# Patient Record
Sex: MALE | Race: WHITE
[De-identification: names, ages, dates, MRNs, and addresses within clinical notes are randomized per-mention and may not be internally consistent; named-entity substitution may affect disease eponyms.]

---

## 2018-12-26 ENCOUNTER — HOSPITAL ENCOUNTER (EMERGENCY)
Dept: HOSPITAL 12 - ER | Age: 83
Discharge: ANOTHER HOSPITAL | End: 2018-12-26
Payer: COMMERCIAL

## 2018-12-26 VITALS — HEIGHT: 66 IN | BODY MASS INDEX: 24.11 KG/M2 | WEIGHT: 150 LBS

## 2018-12-26 VITALS — DIASTOLIC BLOOD PRESSURE: 64 MMHG | SYSTOLIC BLOOD PRESSURE: 123 MMHG

## 2018-12-26 DIAGNOSIS — G45.9: Primary | ICD-10-CM

## 2018-12-26 DIAGNOSIS — Z79.01: ICD-10-CM

## 2018-12-26 DIAGNOSIS — E78.5: ICD-10-CM

## 2018-12-26 DIAGNOSIS — I10: ICD-10-CM

## 2018-12-26 DIAGNOSIS — Z79.899: ICD-10-CM

## 2018-12-26 LAB
ALP SERPL-CCNC: 81 U/L (ref 50–136)
ALT SERPL W/O P-5'-P-CCNC: 36 U/L (ref 16–63)
AST SERPL-CCNC: 29 U/L (ref 15–37)
BASOPHILS # BLD AUTO: 0 K/UL (ref 0–8)
BASOPHILS NFR BLD AUTO: 0.4 % (ref 0–2)
BILIRUB DIRECT SERPL-MCNC: 0.2 MG/DL (ref 0–0.2)
BILIRUB SERPL-MCNC: 0.7 MG/DL (ref 0.2–1)
BUN SERPL-MCNC: 19 MG/DL (ref 7–18)
CHLORIDE SERPL-SCNC: 104 MMOL/L (ref 98–107)
CO2 SERPL-SCNC: 22 MMOL/L (ref 21–32)
CREAT SERPL-MCNC: 1.2 MG/DL (ref 0.6–1.3)
EOSINOPHIL # BLD AUTO: 0.1 K/UL (ref 0–0.7)
EOSINOPHIL NFR BLD AUTO: 1.3 % (ref 0–7)
GLUCOSE SERPL-MCNC: 209 MG/DL (ref 74–106)
HCT VFR BLD AUTO: 37.8 % (ref 36.7–47.1)
HGB BLD-MCNC: 12.7 G/DL (ref 12.5–16.3)
LYMPHOCYTES # BLD AUTO: 2.5 K/UL (ref 20–40)
LYMPHOCYTES NFR BLD AUTO: 34.3 % (ref 20.5–51.5)
MCH RBC QN AUTO: 31.7 UUG (ref 23.8–33.4)
MCHC RBC AUTO-ENTMCNC: 34 G/DL (ref 32.5–36.3)
MCV RBC AUTO: 94 FL (ref 73–96.2)
MONOCYTES # BLD AUTO: 0.6 K/UL (ref 2–10)
MONOCYTES NFR BLD AUTO: 8.6 % (ref 0–11)
NEUTROPHILS # BLD AUTO: 4 K/UL (ref 1.8–8.9)
NEUTROPHILS NFR BLD AUTO: 55.4 % (ref 38.5–71.5)
PLATELET # BLD AUTO: 165 K/UL (ref 152–348)
POTASSIUM SERPL-SCNC: 3.4 MMOL/L (ref 3.5–5.1)
RBC # BLD AUTO: 4.03 MIL/UL (ref 4.06–5.63)
WBC # BLD AUTO: 7.2 K/UL (ref 3.6–10.2)
WS STN SPEC: 7.6 G/DL (ref 6.4–8.2)

## 2018-12-26 PROCEDURE — A4663 DIALYSIS BLOOD PRESSURE CUFF: HCPCS

## 2018-12-26 NOTE — NUR
PT BIB  [facility] WHERE THE PT BECAME DIZZY. PER PARAMEDICS PT NON-VERBAL 
MAY BE DUE TO LANGUAGE BARRIER. PT PLACED ON BED, PT NON-VERBAL AT THIS POINT.

## 2018-12-26 NOTE — NUR
-------------------------------------------------------------------------------

            *** Note undone in [facility] - 12/26/18 at 1646 by TRACI ***             

-------------------------------------------------------------------------------

Code stroke was cleared by Dr [email or web address].

## 2018-12-26 NOTE — NUR
-------------------------------------------------------------------------------

            *** Note undone in [facility] [place] - 12/26/18 at 1646 by TRACI ***             

-------------------------------------------------------------------------------

Code Stroke was called.

## 2018-12-26 NOTE — NUR
PT WILL BE TRANSFERRED TO [facility] VIA PRN UNIT 87 ([facility] UNIT). 
VSS. NO COMPLAINTS AT THIS TIME.

## 2018-12-26 NOTE — NUR
RECEIVED SHIFT REPORT FROM ADRIANA COLLINS. PT RESTING IN BED AT THIS TIME. VSS. 
FAMILY AT BEDSIDE. 



AWAITING FOR CALL-BACK FROM [facility].

## 2018-12-26 NOTE — NUR
RECEIVED CALL FROM [place]:



ACCEPTING FACILITY [facility]

MD: KRIS BERG 

ROOM [id number]

REPORT [phone]

[facility] ([facility]) ETA 2100